# Patient Record
Sex: MALE | URBAN - METROPOLITAN AREA
[De-identification: names, ages, dates, MRNs, and addresses within clinical notes are randomized per-mention and may not be internally consistent; named-entity substitution may affect disease eponyms.]

---

## 2019-10-08 ENCOUNTER — APPOINTMENT (OUTPATIENT)
Dept: URBAN - METROPOLITAN AREA CLINIC 237 | Age: 84
Setting detail: DERMATOLOGY
End: 2019-10-08

## 2019-10-08 DIAGNOSIS — L57.0 ACTINIC KERATOSIS: ICD-10-CM

## 2019-10-08 DIAGNOSIS — L81.7 PIGMENTED PURPURIC DERMATOSIS: ICD-10-CM

## 2019-10-08 PROBLEM — D48.5 NEOPLASM OF UNCERTAIN BEHAVIOR OF SKIN: Status: ACTIVE | Noted: 2019-10-08

## 2019-10-08 PROCEDURE — OTHER PATIENT SPECIFIC COUNSELING: OTHER

## 2019-10-08 PROCEDURE — 99202 OFFICE O/P NEW SF 15 MIN: CPT | Mod: 25

## 2019-10-08 PROCEDURE — 69100 BIOPSY OF EXTERNAL EAR: CPT | Mod: 59

## 2019-10-08 PROCEDURE — OTHER LIQUID NITROGEN: OTHER

## 2019-10-08 PROCEDURE — OTHER MIPS QUALITY: OTHER

## 2019-10-08 PROCEDURE — 17000 DESTRUCT PREMALG LESION: CPT

## 2019-10-08 PROCEDURE — OTHER PRESCRIPTION: OTHER

## 2019-10-08 PROCEDURE — OTHER MEDICATION COUNSELING: OTHER

## 2019-10-08 PROCEDURE — OTHER BIOPSY BY SHAVE METHOD: OTHER

## 2019-10-08 PROCEDURE — 17003 DESTRUCT PREMALG LES 2-14: CPT

## 2019-10-08 RX ORDER — TRIAMCINOLONE ACETONIDE 1 MG/G
CREAM TOPICAL BID PRN
Qty: 1 | Refills: 0 | Status: ERX | COMMUNITY
Start: 2019-10-08

## 2019-10-08 ASSESSMENT — LOCATION SIMPLE DESCRIPTION DERM
LOCATION SIMPLE: RIGHT FOREHEAD
LOCATION SIMPLE: LEFT TEMPLE
LOCATION SIMPLE: RIGHT CALF
LOCATION SIMPLE: RIGHT CHEEK
LOCATION SIMPLE: LEFT FOREHEAD
LOCATION SIMPLE: INFERIOR FOREHEAD

## 2019-10-08 ASSESSMENT — LOCATION DETAILED DESCRIPTION DERM
LOCATION DETAILED: RIGHT SUPERIOR FOREHEAD
LOCATION DETAILED: LEFT LATERAL TEMPLE
LOCATION DETAILED: RIGHT SUPERIOR LATERAL BUCCAL CHEEK
LOCATION DETAILED: INFERIOR MID FOREHEAD
LOCATION DETAILED: RIGHT MEDIAL FOREHEAD
LOCATION DETAILED: RIGHT DISTAL CALF
LOCATION DETAILED: RIGHT INFERIOR MEDIAL FOREHEAD
LOCATION DETAILED: LEFT INFERIOR FOREHEAD

## 2019-10-08 ASSESSMENT — LOCATION ZONE DERM
LOCATION ZONE: FACE
LOCATION ZONE: LEG

## 2019-10-08 NOTE — HPI: SKIN LESION
How Severe Is Your Skin Lesion?: mild
Has Your Skin Lesion Been Treated?: been treated
Is This A New Presentation, Or A Follow-Up?: Skin Lesion
How Severe Is Your Skin Lesion?: moderate
Has Your Skin Lesion Been Treated?: not been treated
Is This A New Presentation, Or A Follow-Up?: Growth

## 2019-10-08 NOTE — PROCEDURE: BIOPSY BY SHAVE METHOD
Size Of Lesion In Cm: 1
Curettage Text: The wound bed was treated with curettage after the biopsy was performed.
Electrodesiccation And Curettage Text: The wound bed was treated with electrodesiccation and curettage after the biopsy was performed.
Bill For Surgical Tray: no
Was A Bandage Applied: Yes
Detail Level: Detailed
Consent: Written consent was obtained and risks were reviewed including but not limited to scarring, infection, bleeding, scabbing, incomplete removal, nerve damage and allergy to anesthesia.
Biopsy Type: H and E
Dressing: bandage
Anesthesia Type: 1% lidocaine with epinephrine
X Size Of Lesion In Cm: 0.7
Hemostasis: Drysol
Post-Care Instructions: I reviewed with the patient in detail post-care instructions. Patient is to keep the biopsy site dry overnight, and then apply bacitracin twice daily until healed. Patient may apply hydrogen peroxide soaks to remove any crusting.
Depth Of Biopsy: dermis
Anesthesia Volume In Cc (Will Not Render If 0): 0.5
Biopsy Method: Personna blade
Notification Instructions: Patient will be notified of biopsy results. However, patient instructed to call the office if not contacted within 2 weeks.
Silver Nitrate Text: The wound bed was treated with silver nitrate after the biopsy was performed.
Wound Care: Petrolatum
Cryotherapy Text: The wound bed was treated with cryotherapy after the biopsy was performed.
Additional Anesthesia Volume In Cc (Will Not Render If 0): 0
Type Of Destruction Used: Curettage
Billing Type: Third-Party Bill
Electrodesiccation Text: The wound bed was treated with electrodesiccation after the biopsy was performed.

## 2019-10-08 NOTE — PROCEDURE: LIQUID NITROGEN
Render Post-Care Instructions In Note?: no
Number Of Freeze-Thaw Cycles: 1 freeze-thaw cycle
Duration Of Freeze Thaw-Cycle (Seconds): 0
Post-Care Instructions: I reviewed with the patient in detail post-care instructions. Patient is to wear sunprotection, and avoid picking at any of the treated lesions. Pt may apply Vaseline to crusted or scabbing areas.
Detail Level: Zone
Consent: The patient's consent was obtained including but not limited to risks of crusting, scabbing, blistering, scarring, darker or lighter pigmentary change, recurrence, incomplete removal and infection.
Total Number Of Aks Treated: 7

## 2019-10-08 NOTE — PROCEDURE: PATIENT SPECIFIC COUNSELING
Advised pt to keep area clean and use AHU for moisture.
Advised pt getting OTC compression socks 15-20 mmHg. Can trial triamcinolone ointment QD-BID prn for pruritus or scaling.
Detail Level: Zone

## 2019-10-08 NOTE — PROCEDURE: MIPS QUALITY
Detail Level: Detailed
Quality 110: Preventive Care And Screening: Influenza Immunization: Influenza Immunization Administered during Influenza season
Quality 111:Pneumonia Vaccination Status For Older Adults: Pneumococcal Vaccination Previously Received
Quality 474: Zoster Vaccination Status: Shingrix Vaccination Administered or Previously Received

## 2019-10-08 NOTE — PROCEDURE: MEDICATION COUNSELING
Xellauraz Pregnancy And Lactation Text: This medication is Pregnancy Category D and is not considered safe during pregnancy.  The risk during breast feeding is also uncertain.

## 2019-11-05 ENCOUNTER — APPOINTMENT (OUTPATIENT)
Dept: URBAN - METROPOLITAN AREA CLINIC 237 | Age: 84
Setting detail: DERMATOLOGY
End: 2019-11-05

## 2019-11-05 DIAGNOSIS — L81.7 PIGMENTED PURPURIC DERMATOSIS: ICD-10-CM

## 2019-11-05 DIAGNOSIS — L82.1 OTHER SEBORRHEIC KERATOSIS: ICD-10-CM

## 2019-11-05 DIAGNOSIS — L57.0 ACTINIC KERATOSIS: ICD-10-CM

## 2019-11-05 DIAGNOSIS — D22 MELANOCYTIC NEVI: ICD-10-CM

## 2019-11-05 PROBLEM — D48.5 NEOPLASM OF UNCERTAIN BEHAVIOR OF SKIN: Status: ACTIVE | Noted: 2019-11-05

## 2019-11-05 PROBLEM — D22.5 MELANOCYTIC NEVI OF TRUNK: Status: ACTIVE | Noted: 2019-11-05

## 2019-11-05 PROBLEM — C44.212 BASAL CELL CARCINOMA OF SKIN OF RIGHT EAR AND EXTERNAL AURICULAR CANAL: Status: ACTIVE | Noted: 2019-11-05

## 2019-11-05 PROCEDURE — OTHER DIAGNOSIS COMMENT: OTHER

## 2019-11-05 PROCEDURE — OTHER PATIENT SPECIFIC COUNSELING: OTHER

## 2019-11-05 PROCEDURE — 17003 DESTRUCT PREMALG LES 2-14: CPT

## 2019-11-05 PROCEDURE — 17000 DESTRUCT PREMALG LESION: CPT | Mod: 59

## 2019-11-05 PROCEDURE — 99213 OFFICE O/P EST LOW 20 MIN: CPT | Mod: 25

## 2019-11-05 PROCEDURE — 11102 TANGNTL BX SKIN SINGLE LES: CPT

## 2019-11-05 PROCEDURE — OTHER COUNSELING: OTHER

## 2019-11-05 PROCEDURE — OTHER PHOTO-DOCUMENTATION: OTHER

## 2019-11-05 PROCEDURE — OTHER OBSERVATION AND MEASURE: OTHER

## 2019-11-05 PROCEDURE — OTHER BIOPSY BY SHAVE METHOD: OTHER

## 2019-11-05 PROCEDURE — OTHER LIQUID NITROGEN: OTHER

## 2019-11-05 PROCEDURE — OTHER MIPS QUALITY: OTHER

## 2019-11-05 PROCEDURE — OTHER TREATMENT REGIMEN: OTHER

## 2019-11-05 PROCEDURE — OTHER OBSERVATION: OTHER

## 2019-11-05 ASSESSMENT — LOCATION DETAILED DESCRIPTION DERM
LOCATION DETAILED: RIGHT SUPERIOR MEDIAL MIDBACK
LOCATION DETAILED: SUPERIOR MID FOREHEAD
LOCATION DETAILED: RIGHT MEDIAL FOREHEAD
LOCATION DETAILED: RIGHT SUPERIOR FOREHEAD
LOCATION DETAILED: RIGHT DISTAL CALF
LOCATION DETAILED: RIGHT INFERIOR MEDIAL FOREHEAD
LOCATION DETAILED: LEFT INFERIOR FOREHEAD
LOCATION DETAILED: RIGHT INFERIOR MEDIAL MIDBACK
LOCATION DETAILED: INFERIOR THORACIC SPINE
LOCATION DETAILED: LEFT FOREHEAD
LOCATION DETAILED: RIGHT LATERAL FOREHEAD

## 2019-11-05 ASSESSMENT — LOCATION SIMPLE DESCRIPTION DERM
LOCATION SIMPLE: RIGHT FOREHEAD
LOCATION SIMPLE: RIGHT CALF
LOCATION SIMPLE: SUPERIOR FOREHEAD
LOCATION SIMPLE: RIGHT LOWER BACK
LOCATION SIMPLE: UPPER BACK
LOCATION SIMPLE: LEFT FOREHEAD

## 2019-11-05 ASSESSMENT — LOCATION ZONE DERM
LOCATION ZONE: LEG
LOCATION ZONE: TRUNK
LOCATION ZONE: FACE

## 2019-11-05 NOTE — PROCEDURE: OBSERVATION
Debrox drops from over the counter. Instill 3-5 drops to L ear twice daily for 5-7 days.    Flonase, nasocort, nasonex - any steroid nasal spray. 1 spray into each side of nose daily for the next two weeks. There likely won't be any \"kids\" version/dose.  The kids dose is one spray as opposed to two sprays for adults.  Call if no improvement of right ear in two weeks, sooner if issues or concerns arise.  
Size Of Lesion: 4X3 mm
Detail Level: Detailed
Size Of Lesion: 4X2 mm

## 2019-11-05 NOTE — PROCEDURE: PATIENT SPECIFIC COUNSELING
Discussed tx’ing with LN2 today, but once pt done seeing Dr. Stephenson and site is healed, MD thinks pt would benefit from using 5FU. Will start in January after the holidays.

## 2019-11-05 NOTE — PROCEDURE: LIQUID NITROGEN
Number Of Freeze-Thaw Cycles: 2 freeze-thaw cycles
Render Note In Bullet Format When Appropriate: No
Detail Level: Detailed
Post-Care Instructions: I reviewed with the patient in detail post-care instructions. Patient is to wear sunprotection, and avoid picking at any of the treated lesions. Pt may apply Vaseline to crusted or scabbing areas.
Duration Of Freeze Thaw-Cycle (Seconds): 0
Consent: The patient's consent was obtained including but not limited to risks of crusting, scabbing, blistering, scarring, darker or lighter pigmentary change, recurrence, incomplete removal and infection.

## 2019-11-05 NOTE — PROCEDURE: PATIENT SPECIFIC COUNSELING
Pt has an appt this Thursday w/ Dr. Stephenson [facility] Pt has an appt this Thursday w/ Dr[facility]

## 2019-11-05 NOTE — PROCEDURE: BIOPSY BY SHAVE METHOD
Wound Care: Petrolatum
Electrodesiccation And Curettage Text: The wound bed was treated with electrodesiccation and curettage after the biopsy was performed.
Detail Level: Detailed
Was A Bandage Applied: Yes
Post-Care Instructions: I reviewed with the patient in detail post-care instructions. Patient is to keep the biopsy site dry overnight, and then apply bacitracin twice daily until healed. Patient may apply hydrogen peroxide soaks to remove any crusting.
X Size Of Lesion In Cm: 0
Cryotherapy Text: The wound bed was treated with cryotherapy after the biopsy was performed.
Biopsy Type: H and E
Render Post-Care Instructions In Note?: no
Depth Of Biopsy: dermis
Hemostasis: Drysol
Dressing: bandage
Electrodesiccation Text: The wound bed was treated with electrodesiccation after the biopsy was performed.
Anesthesia Volume In Cc (Will Not Render If 0): 0.5
Billing Type: Third-Party Bill
Silver Nitrate Text: The wound bed was treated with silver nitrate after the biopsy was performed.
Curettage Text: The wound bed was treated with curettage after the biopsy was performed.
Type Of Destruction Used: Curettage
Notification Instructions: Patient will be notified of biopsy results. However, patient instructed to call the office if not contacted within 2 weeks.
Consent: Written consent was obtained and risks were reviewed including but not limited to scarring, infection, bleeding, scabbing, incomplete removal, nerve damage and allergy to anesthesia.
Anesthesia Type: 1% lidocaine with epinephrine
Biopsy Method: Dermablade

## 2019-11-05 NOTE — PROCEDURE: DIAGNOSIS COMMENT
Detail Level: Simple
Comment: + siddharth mccormick
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

## 2019-11-05 NOTE — HPI: FULL BODY SKIN EXAMINATION
What Type Of Note Output Would You Prefer (Optional)?: Bullet Format
What Is The Reason For Today's Visit?: Full Body Skin Examination
What Is The Reason For Today's Visit? (Being Monitored For X): the development of new lesions
Additional History: Pt denies any specific areas of concern.

## 2020-01-07 ENCOUNTER — APPOINTMENT (OUTPATIENT)
Dept: URBAN - METROPOLITAN AREA CLINIC 237 | Age: 85
Setting detail: DERMATOLOGY
End: 2020-01-07

## 2020-01-07 DIAGNOSIS — L81.7 PIGMENTED PURPURIC DERMATOSIS: ICD-10-CM

## 2020-01-07 DIAGNOSIS — L57.8 OTHER SKIN CHANGES DUE TO CHRONIC EXPOSURE TO NONIONIZING RADIATION: ICD-10-CM

## 2020-01-07 DIAGNOSIS — D485 NEOPLASM OF UNCERTAIN BEHAVIOR OF SKIN: ICD-10-CM

## 2020-01-07 PROBLEM — D48.5 NEOPLASM OF UNCERTAIN BEHAVIOR OF SKIN: Status: ACTIVE | Noted: 2020-01-07

## 2020-01-07 PROBLEM — L30.9 DERMATITIS, UNSPECIFIED: Status: ACTIVE | Noted: 2020-01-07

## 2020-01-07 PROCEDURE — 11104 PUNCH BX SKIN SINGLE LESION: CPT

## 2020-01-07 PROCEDURE — OTHER PATIENT SPECIFIC COUNSELING: OTHER

## 2020-01-07 PROCEDURE — OTHER MEDICATION COUNSELING: OTHER

## 2020-01-07 PROCEDURE — OTHER PHOTO-DOCUMENTATION: OTHER

## 2020-01-07 PROCEDURE — 99213 OFFICE O/P EST LOW 20 MIN: CPT | Mod: 25

## 2020-01-07 PROCEDURE — OTHER TREATMENT REGIMEN: OTHER

## 2020-01-07 PROCEDURE — OTHER MIPS QUALITY: OTHER

## 2020-01-07 PROCEDURE — OTHER PRESCRIPTION: OTHER

## 2020-01-07 PROCEDURE — OTHER DIAGNOSIS COMMENT: OTHER

## 2020-01-07 PROCEDURE — OTHER OBSERVATION: OTHER

## 2020-01-07 PROCEDURE — OTHER BIOPSY BY PUNCH METHOD: OTHER

## 2020-01-07 RX ORDER — FLUOROURACIL 5 MG/G
SM AMT CREAM TOPICAL BID
Qty: 1 | Refills: 0 | Status: ERX | COMMUNITY
Start: 2020-01-07

## 2020-01-07 ASSESSMENT — LOCATION DETAILED DESCRIPTION DERM
LOCATION DETAILED: LEFT DISTAL CALF
LOCATION DETAILED: LEFT CENTRAL LATERAL NECK
LOCATION DETAILED: LEFT INFERIOR FOREHEAD
LOCATION DETAILED: RIGHT SUPERIOR MEDIAL FOREHEAD
LOCATION DETAILED: RIGHT LATERAL PROXIMAL PRETIBIAL REGION
LOCATION DETAILED: RIGHT CENTRAL TEMPLE
LOCATION DETAILED: RIGHT DISTAL CALF

## 2020-01-07 ASSESSMENT — LOCATION SIMPLE DESCRIPTION DERM
LOCATION SIMPLE: RIGHT PRETIBIAL REGION
LOCATION SIMPLE: RIGHT TEMPLE
LOCATION SIMPLE: RIGHT FOREHEAD
LOCATION SIMPLE: NECK
LOCATION SIMPLE: LEFT CALF
LOCATION SIMPLE: RIGHT CALF
LOCATION SIMPLE: LEFT FOREHEAD

## 2020-01-07 ASSESSMENT — LOCATION ZONE DERM
LOCATION ZONE: FACE
LOCATION ZONE: LEG
LOCATION ZONE: NECK

## 2020-01-07 NOTE — PROCEDURE: TREATMENT REGIMEN
Detail Level: Zone
Continue Regimen: TMC 0.1% cr bid PRN for itch
Initiate Treatment: 5FU bid X 2 weeks

## 2020-01-07 NOTE — PROCEDURE: BIOPSY BY PUNCH METHOD
X Size Of Lesion In Cm (Optional): 0
Anesthesia Volume In Cc (Will Not Render If 0): 0.5
Render In Bullet Format When Appropriate: No
Dressing: bandage
Epidermal Sutures: 4-0 Nylon
Biopsy Type: H and E
Punch Size In Mm: 4
Anesthesia Type: 1% lidocaine with epinephrine
Hemostasis: None
Suture Removal: 14 days
Post-Care Instructions: I reviewed with the patient in detail post-care instructions. Patient is to keep the biopsy site dry overnight, and then apply bacitracin twice daily until healed. Patient may apply hydrogen peroxide soaks to remove any crusting.
Was A Bandage Applied: Yes
Wound Care: Petrolatum
Detail Level: Detailed
Home Suture Removal Text: Patient was provided a home suture removal kit and will remove their sutures at home.  If they have any questions or difficulties they will call the office.
Number Of Epidermal Sutures (Optional): 2
Notification Instructions: Patient will be notified of biopsy results. However, patient instructed to call the office if not contacted within 2 weeks.
Consent: Written consent was obtained and risks were reviewed including but not limited to scarring, infection, bleeding, scabbing, incomplete removal, nerve damage and allergy to anesthesia.
Billing Type: Third-Party Bill

## 2020-01-07 NOTE — PROCEDURE: PHOTO-DOCUMENTATION
Details (Free Text): 2
Detail Level: Zone
Photo Preface (Leave Blank If You Do Not Want): Photographs were obtained today

## 2020-01-07 NOTE — PROCEDURE: PATIENT SPECIFIC COUNSELING
Detail Level: Zone
Discussed pt would be a good candidate for 5FU. MD would like to start tx’ing forehead and temples first and see how skin responds/tolerates. Discussed tx length and expectations. Will F/U with pt after 2 weeks of tx.
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 Since LOV, has developed numerous petechiae on both legs with sharp cutoff at knee. Still suspect PPD, however given drastic change since LOV, will biopsy to r/o underlying LCV. \\n\\nConfirmed no other health changes since LOV and no joint pain, bowel pain, or hematuria. No new medications.

## 2020-01-22 ENCOUNTER — APPOINTMENT (OUTPATIENT)
Dept: URBAN - METROPOLITAN AREA CLINIC 237 | Age: 85
Setting detail: DERMATOLOGY
End: 2020-01-22

## 2020-01-22 DIAGNOSIS — L81.7 PIGMENTED PURPURIC DERMATOSIS: ICD-10-CM

## 2020-01-22 DIAGNOSIS — D485 NEOPLASM OF UNCERTAIN BEHAVIOR OF SKIN: ICD-10-CM

## 2020-01-22 DIAGNOSIS — L24.4 IRRITANT CONTACT DERMATITIS DUE TO DRUGS IN CONTACT WITH SKIN: ICD-10-CM

## 2020-01-22 PROBLEM — D48.5 NEOPLASM OF UNCERTAIN BEHAVIOR OF SKIN: Status: ACTIVE | Noted: 2020-01-22

## 2020-01-22 PROCEDURE — OTHER PRESCRIPTION: OTHER

## 2020-01-22 PROCEDURE — OTHER TREATMENT REGIMEN: OTHER

## 2020-01-22 PROCEDURE — 99024 POSTOP FOLLOW-UP VISIT: CPT

## 2020-01-22 PROCEDURE — OTHER PATHOLOGY DISCUSSION: OTHER

## 2020-01-22 PROCEDURE — 99213 OFFICE O/P EST LOW 20 MIN: CPT | Mod: 25

## 2020-01-22 PROCEDURE — 11102 TANGNTL BX SKIN SINGLE LES: CPT

## 2020-01-22 PROCEDURE — OTHER BIOPSY BY SHAVE METHOD: OTHER

## 2020-01-22 PROCEDURE — OTHER MIPS QUALITY: OTHER

## 2020-01-22 PROCEDURE — OTHER PHOTO-DOCUMENTATION: OTHER

## 2020-01-22 PROCEDURE — OTHER PATIENT SPECIFIC COUNSELING: OTHER

## 2020-01-22 PROCEDURE — OTHER DIAGNOSIS COMMENT: OTHER

## 2020-01-22 RX ORDER — FLUOCINONIDE 0.5 MG/G
SM AMT OINTMENT TOPICAL BID
Qty: 1 | Refills: 0 | Status: ERX | COMMUNITY
Start: 2020-01-22

## 2020-01-22 ASSESSMENT — LOCATION SIMPLE DESCRIPTION DERM
LOCATION SIMPLE: RIGHT CALF
LOCATION SIMPLE: LEFT CALF
LOCATION SIMPLE: RIGHT TEMPLE
LOCATION SIMPLE: LEFT FOREHEAD
LOCATION SIMPLE: RIGHT PRETIBIAL REGION
LOCATION SIMPLE: LEFT PRETIBIAL REGION
LOCATION SIMPLE: RIGHT FOREHEAD
LOCATION SIMPLE: NECK

## 2020-01-22 ASSESSMENT — LOCATION ZONE DERM
LOCATION ZONE: NECK
LOCATION ZONE: FACE
LOCATION ZONE: LEG

## 2020-01-22 ASSESSMENT — LOCATION DETAILED DESCRIPTION DERM
LOCATION DETAILED: RIGHT CENTRAL TEMPLE
LOCATION DETAILED: RIGHT DISTAL CALF
LOCATION DETAILED: LEFT PROXIMAL PRETIBIAL REGION
LOCATION DETAILED: LEFT INFERIOR FOREHEAD
LOCATION DETAILED: RIGHT PROXIMAL PRETIBIAL REGION
LOCATION DETAILED: RIGHT SUPERIOR MEDIAL FOREHEAD
LOCATION DETAILED: LEFT CENTRAL LATERAL NECK
LOCATION DETAILED: LEFT DISTAL CALF

## 2020-01-22 NOTE — PROCEDURE: PHOTO-DOCUMENTATION
Photo Preface (Leave Blank If You Do Not Want): Photographs were obtained today
Detail Level: Zone
Details (Free Text): 2
Details (Free Text): 1

## 2020-01-22 NOTE — PROCEDURE: BIOPSY BY SHAVE METHOD
Notification Instructions: Patient will be notified of biopsy results. However, patient instructed to call the office if not contacted within 2 weeks.
Detail Level: Detailed
Was A Bandage Applied: Yes
Destruction After The Procedure: No
Consent: Written consent was obtained and risks were reviewed including but not limited to scarring, infection, bleeding, scabbing, incomplete removal, nerve damage and allergy to anesthesia.
Post-Care Instructions: I reviewed with the patient in detail post-care instructions. Patient is to keep the biopsy site dry overnight, and then apply bacitracin twice daily until healed. Patient may apply hydrogen peroxide soaks to remove any crusting.
Electrodesiccation And Curettage Text: The wound bed was treated with electrodesiccation and curettage after the biopsy was performed.
Size Of Lesion In Cm: 0
Anesthesia Volume In Cc (Will Not Render If 0): 0.2
Electrodesiccation Text: The wound bed was treated with electrodesiccation after the biopsy was performed.
Biopsy Type: H and E
Curettage Text: The wound bed was treated with curettage after the biopsy was performed.
Silver Nitrate Text: The wound bed was treated with silver nitrate after the biopsy was performed.
Dressing: Band-Aid
Biopsy Method: Dermablade
Depth Of Biopsy: dermis
Anesthesia Type: 1% lidocaine with epinephrine
Wound Care: Petrolatum
Billing Type: Third-Party Bill
Type Of Destruction Used: Curettage
Cryotherapy Text: The wound bed was treated with cryotherapy after the biopsy was performed.
Hemostasis: Drysol

## 2020-01-22 NOTE — PROCEDURE: DIAGNOSIS COMMENT
Detail Level: Simple
Comment: Punch bx’d 1/7/2020: stasis changes, favor hypersensitivity reaction with extravasated RBCs

## 2020-01-22 NOTE — PROCEDURE: PATIENT SPECIFIC COUNSELING
Discussed increasing topical steroid potency to larger plaque on left leg
Detail Level: Zone
Discussed pt had good 5FU rxn; okay to D/C to. Gave pt post-care instructions.

## 2020-01-22 NOTE — PROCEDURE: PATHOLOGY DISCUSSION
Add 28234 Cpt? (Important Note: In 2017 The Use Of 21339 Is Being Tracked By Cms To Determine Future Global Period Reimbursement For Global Periods): yes
Detail Level: Zone

## 2020-01-22 NOTE — PROCEDURE: TREATMENT REGIMEN
Initiate Treatment: Lidex DECLAN bid X 2 weeks
Discontinue Regimen: TMC 0.1% cr bid PRN for itch
Detail Level: Zone
Discontinue Regimen: 5FU
Initiate Treatment: HC 1% cr tid X7-20d to help with redness\\nAHU/Vaseline ad esther to help with crust

## 2020-02-18 ENCOUNTER — APPOINTMENT (OUTPATIENT)
Dept: URBAN - METROPOLITAN AREA CLINIC 237 | Age: 85
Setting detail: DERMATOLOGY
End: 2020-02-18

## 2020-02-18 DIAGNOSIS — L81.7 PIGMENTED PURPURIC DERMATOSIS: ICD-10-CM

## 2020-02-18 DIAGNOSIS — Z85.828 PERSONAL HISTORY OF OTHER MALIGNANT NEOPLASM OF SKIN: ICD-10-CM

## 2020-02-18 PROCEDURE — OTHER DIAGNOSIS COMMENT: OTHER

## 2020-02-18 PROCEDURE — 99213 OFFICE O/P EST LOW 20 MIN: CPT

## 2020-02-18 PROCEDURE — OTHER TREATMENT REGIMEN: OTHER

## 2020-02-18 PROCEDURE — OTHER COUNSELING: OTHER

## 2020-02-18 PROCEDURE — OTHER OBSERVATION: OTHER

## 2020-02-18 PROCEDURE — OTHER PATIENT SPECIFIC COUNSELING: OTHER

## 2020-02-18 PROCEDURE — OTHER MIPS QUALITY: OTHER

## 2020-02-18 ASSESSMENT — LOCATION DETAILED DESCRIPTION DERM
LOCATION DETAILED: RIGHT PROXIMAL PRETIBIAL REGION
LOCATION DETAILED: LEFT PROXIMAL PRETIBIAL REGION
LOCATION DETAILED: LEFT SUPERIOR LATERAL NECK

## 2020-02-18 ASSESSMENT — LOCATION SIMPLE DESCRIPTION DERM
LOCATION SIMPLE: RIGHT PRETIBIAL REGION
LOCATION SIMPLE: LEFT PRETIBIAL REGION
LOCATION SIMPLE: NECK

## 2020-02-18 ASSESSMENT — LOCATION ZONE DERM
LOCATION ZONE: NECK
LOCATION ZONE: LEG

## 2020-02-18 NOTE — PROCEDURE: PATIENT SPECIFIC COUNSELING
MD agrees overall improved. Still c/w PPD. Prior lab work (2019) demonstrated elevated creatinine; per patient, PCP and nephro concerned this was due to high Xarelto dose. Dose has since been lowered; plan to recheck BUN/Cr in April 2020. Given prior concern for LCV (biopsy negative), MD would like to recheck kidney function as well. Patient will have next round of lab work faxed to us. Denies abdominal pain, blood in urine, blood in stool.
Detail Level: Zone

## 2020-02-18 NOTE — PROCEDURE: TREATMENT REGIMEN
Detail Level: Zone
Initiate Treatment: Thick moisturizer for general itch on legs
Continue Regimen: Lidex 0.05% DECLAN to any thickened plaques, only PRN

## 2020-02-18 NOTE — PROCEDURE: DIAGNOSIS COMMENT
Detail Level: Simple
Comment: Well diff.\\nTx’d surgically with Dr. Stephenson (2/2020)
Comment: Punch bx’d 1/7/2020: stasis changes, favor hypersensitivity reaction with extravasated RBCs

## 2021-12-08 NOTE — PROCEDURE: MEDICATION COUNSELING
Vaccine Information Statement(s) or the Emergency Use Authorization was given today. This has been reviewed, questions answered, and verbal consent given by Parent for injection(s) and administration of Meningococcal Serogroup B.        Patient tolerated without incident. See immunization grid for documentation.     Topical Sulfur Applications Pregnancy And Lactation Text: This medication is Pregnancy Category C and has an unknown safety profile during pregnancy. It is unknown if this topical medication is excreted in breast milk.

## 2023-06-28 NOTE — PROCEDURE: MEDICATION COUNSELING
Male Completion Statement: After discussing his treatment course we decided to discontinue isotretinoin therapy at this time. He shouldn't donate blood for one month after the last dose. He should call with any new symptoms of depression. Use Enhanced Medication Counseling?: No

## 2024-04-30 NOTE — PROCEDURE: MEDICATION COUNSELING
7 (severe pain) Dupixent Counseling: I discussed with the patient the risks of dupilumab including but not limited to eye infection and irritation, cold sores, injection site reactions, worsening of asthma, allergic reactions and increased risk of parasitic infection.  Live vaccines should be avoided while taking dupilumab. Dupilumab will also interact with certain medications such as warfarin and cyclosporine. The patient understands that monitoring is required and they must alert us or the primary physician if symptoms of infection or other concerning signs are noted.